# Patient Record
Sex: MALE | Race: WHITE
[De-identification: names, ages, dates, MRNs, and addresses within clinical notes are randomized per-mention and may not be internally consistent; named-entity substitution may affect disease eponyms.]

---

## 2019-07-12 ENCOUNTER — HOSPITAL ENCOUNTER (INPATIENT)
Dept: HOSPITAL 54 - ER | Age: 79
LOS: 4 days | Discharge: HOME HEALTH | DRG: 871 | End: 2019-07-16
Attending: INTERNAL MEDICINE | Admitting: NURSE PRACTITIONER
Payer: MEDICARE

## 2019-07-12 VITALS — SYSTOLIC BLOOD PRESSURE: 108 MMHG | DIASTOLIC BLOOD PRESSURE: 58 MMHG

## 2019-07-12 VITALS — DIASTOLIC BLOOD PRESSURE: 55 MMHG | SYSTOLIC BLOOD PRESSURE: 106 MMHG

## 2019-07-12 VITALS — HEIGHT: 70 IN | WEIGHT: 211 LBS | BODY MASS INDEX: 30.21 KG/M2

## 2019-07-12 VITALS — SYSTOLIC BLOOD PRESSURE: 103 MMHG | DIASTOLIC BLOOD PRESSURE: 59 MMHG

## 2019-07-12 VITALS — DIASTOLIC BLOOD PRESSURE: 59 MMHG | SYSTOLIC BLOOD PRESSURE: 103 MMHG

## 2019-07-12 DIAGNOSIS — N39.0: ICD-10-CM

## 2019-07-12 DIAGNOSIS — Z85.46: ICD-10-CM

## 2019-07-12 DIAGNOSIS — I48.0: ICD-10-CM

## 2019-07-12 DIAGNOSIS — E78.5: ICD-10-CM

## 2019-07-12 DIAGNOSIS — E03.9: ICD-10-CM

## 2019-07-12 DIAGNOSIS — J98.11: ICD-10-CM

## 2019-07-12 DIAGNOSIS — G47.33: ICD-10-CM

## 2019-07-12 DIAGNOSIS — R32: ICD-10-CM

## 2019-07-12 DIAGNOSIS — N50.1: ICD-10-CM

## 2019-07-12 DIAGNOSIS — Z87.442: ICD-10-CM

## 2019-07-12 DIAGNOSIS — J15.9: ICD-10-CM

## 2019-07-12 DIAGNOSIS — Z90.79: ICD-10-CM

## 2019-07-12 DIAGNOSIS — A41.51: Primary | ICD-10-CM

## 2019-07-12 DIAGNOSIS — F41.9: ICD-10-CM

## 2019-07-12 DIAGNOSIS — R93.5: ICD-10-CM

## 2019-07-12 DIAGNOSIS — Z16.12: ICD-10-CM

## 2019-07-12 DIAGNOSIS — K80.20: ICD-10-CM

## 2019-07-12 DIAGNOSIS — E87.2: ICD-10-CM

## 2019-07-12 DIAGNOSIS — N17.0: ICD-10-CM

## 2019-07-12 DIAGNOSIS — Z79.01: ICD-10-CM

## 2019-07-12 DIAGNOSIS — N18.9: ICD-10-CM

## 2019-07-12 DIAGNOSIS — J06.9: ICD-10-CM

## 2019-07-12 DIAGNOSIS — I21.A1: ICD-10-CM

## 2019-07-12 DIAGNOSIS — D69.6: ICD-10-CM

## 2019-07-12 DIAGNOSIS — I13.0: ICD-10-CM

## 2019-07-12 DIAGNOSIS — K86.1: ICD-10-CM

## 2019-07-12 DIAGNOSIS — F32.9: ICD-10-CM

## 2019-07-12 DIAGNOSIS — K57.30: ICD-10-CM

## 2019-07-12 DIAGNOSIS — I50.30: ICD-10-CM

## 2019-07-12 DIAGNOSIS — B96.89: ICD-10-CM

## 2019-07-12 LAB
ALBUMIN SERPL BCP-MCNC: 2.9 G/DL (ref 3.4–5)
ALBUMIN SERPL BCP-MCNC: 3.6 G/DL (ref 3.4–5)
ALP SERPL-CCNC: 108 U/L (ref 46–116)
ALP SERPL-CCNC: 80 U/L (ref 46–116)
ALT SERPL W P-5'-P-CCNC: 34 U/L (ref 12–78)
ALT SERPL W P-5'-P-CCNC: 42 U/L (ref 12–78)
AST SERPL W P-5'-P-CCNC: 33 U/L (ref 15–37)
AST SERPL W P-5'-P-CCNC: 35 U/L (ref 15–37)
BASOPHILS # BLD AUTO: 0 /CMM (ref 0–0.2)
BASOPHILS # BLD AUTO: 0 /CMM (ref 0–0.2)
BASOPHILS NFR BLD AUTO: 0.1 % (ref 0–2)
BASOPHILS NFR BLD AUTO: 0.2 % (ref 0–2)
BILIRUB DIRECT SERPL-MCNC: 0.2 MG/DL (ref 0–0.2)
BILIRUB SERPL-MCNC: 0.5 MG/DL (ref 0.2–1)
BILIRUB SERPL-MCNC: 0.8 MG/DL (ref 0.2–1)
BUN SERPL-MCNC: 27 MG/DL (ref 7–18)
BUN SERPL-MCNC: 29 MG/DL (ref 7–18)
CALCIUM SERPL-MCNC: 8.7 MG/DL (ref 8.5–10.1)
CALCIUM SERPL-MCNC: 9.3 MG/DL (ref 8.5–10.1)
CHLORIDE SERPL-SCNC: 104 MMOL/L (ref 98–107)
CHLORIDE SERPL-SCNC: 105 MMOL/L (ref 98–107)
CHOLEST SERPL-MCNC: 96 MG/DL (ref ?–200)
CO2 SERPL-SCNC: 23 MMOL/L (ref 21–32)
CO2 SERPL-SCNC: 24 MMOL/L (ref 21–32)
CREAT SERPL-MCNC: 2 MG/DL (ref 0.6–1.3)
CREAT SERPL-MCNC: 2 MG/DL (ref 0.6–1.3)
EOSINOPHIL NFR BLD AUTO: 0 % (ref 0–6)
EOSINOPHIL NFR BLD AUTO: 0.2 % (ref 0–6)
GLUCOSE SERPL-MCNC: 142 MG/DL (ref 74–106)
GLUCOSE SERPL-MCNC: 194 MG/DL (ref 74–106)
HCT VFR BLD AUTO: 49 % (ref 39–51)
HCT VFR BLD AUTO: 55 % (ref 39–51)
HDLC SERPL-MCNC: 42 MG/DL (ref 40–60)
HGB BLD-MCNC: 16.4 G/DL (ref 13.5–17.5)
HGB BLD-MCNC: 18.8 G/DL (ref 13.5–17.5)
LDLC SERPL DIRECT ASSAY-MCNC: 48 MG/DL (ref 0–99)
LYMPHOCYTES NFR BLD AUTO: 0.5 /CMM (ref 0.8–4.8)
LYMPHOCYTES NFR BLD AUTO: 0.5 /CMM (ref 0.8–4.8)
LYMPHOCYTES NFR BLD AUTO: 3.7 % (ref 20–44)
LYMPHOCYTES NFR BLD AUTO: 6.8 % (ref 20–44)
LYMPHOCYTES NFR BLD MANUAL: 2 % (ref 16–48)
MAGNESIUM SERPL-MCNC: 1.7 MG/DL (ref 1.8–2.4)
MCHC RBC AUTO-ENTMCNC: 33 G/DL (ref 31–36)
MCHC RBC AUTO-ENTMCNC: 34 G/DL (ref 31–36)
MCV RBC AUTO: 100 FL (ref 80–96)
MCV RBC AUTO: 102 FL (ref 80–96)
MONOCYTES NFR BLD AUTO: 0.4 /CMM (ref 0.1–1.3)
MONOCYTES NFR BLD AUTO: 1.8 /CMM (ref 0.1–1.3)
MONOCYTES NFR BLD AUTO: 14.4 % (ref 2–12)
MONOCYTES NFR BLD AUTO: 4.8 % (ref 2–12)
MONOCYTES NFR BLD MANUAL: 12 % (ref 0–11)
NEUTROPHILS # BLD AUTO: 10.4 /CMM (ref 1.8–8.9)
NEUTROPHILS # BLD AUTO: 6.7 /CMM (ref 1.8–8.9)
NEUTROPHILS NFR BLD AUTO: 81.8 % (ref 43–81)
NEUTROPHILS NFR BLD AUTO: 88 % (ref 43–81)
NEUTS BAND NFR BLD MANUAL: 3 % (ref 0–5)
NEUTS SEG NFR BLD MANUAL: 83 % (ref 42–76)
NT-PROBNP SERPL-MCNC: 823 PG/ML (ref 0–125)
PH UR STRIP: 5 [PH] (ref 5–8)
PHOSPHATE SERPL-MCNC: 2.9 MG/DL (ref 2.5–4.9)
PLATELET # BLD AUTO: 105 /CMM (ref 150–450)
PLATELET # BLD AUTO: 98 /CMM (ref 150–450)
POTASSIUM SERPL-SCNC: 4.1 MMOL/L (ref 3.5–5.1)
POTASSIUM SERPL-SCNC: 4.2 MMOL/L (ref 3.5–5.1)
PROT SERPL-MCNC: 5.5 G/DL (ref 6.4–8.2)
PROT SERPL-MCNC: 6.9 G/DL (ref 6.4–8.2)
PROT UR QL STRIP: 100 MG/DL
RBC # BLD AUTO: 4.84 MIL/UL (ref 4.5–6)
RBC # BLD AUTO: 5.49 MIL/UL (ref 4.5–6)
RBC #/AREA URNS HPF: (no result) /HPF (ref 0–2)
SODIUM SERPL-SCNC: 138 MMOL/L (ref 136–145)
SODIUM SERPL-SCNC: 139 MMOL/L (ref 136–145)
TRIGL SERPL-MCNC: 61 MG/DL (ref 30–150)
TSH SERPL DL<=0.005 MIU/L-ACNC: 0.45 UIU/ML (ref 0.36–3.74)
UROBILINOGEN UR STRIP-MCNC: 0.2 EU/DL
WBC #/AREA URNS HPF: (no result) /HPF (ref 0–3)
WBC NRBC COR # BLD AUTO: 12.7 K/UL (ref 4.3–11)
WBC NRBC COR # BLD AUTO: 7.6 K/UL (ref 4.3–11)

## 2019-07-12 PROCEDURE — A4216 STERILE WATER/SALINE, 10 ML: HCPCS

## 2019-07-12 PROCEDURE — G0378 HOSPITAL OBSERVATION PER HR: HCPCS

## 2019-07-12 RX ADMIN — MODAFINIL SCH MG: 100 TABLET ORAL at 18:21

## 2019-07-12 RX ADMIN — FUROSEMIDE SCH MG: 10 INJECTION, SOLUTION INTRAMUSCULAR; INTRAVENOUS at 09:49

## 2019-07-12 RX ADMIN — MODAFINIL SCH MG: 100 TABLET ORAL at 11:17

## 2019-07-12 RX ADMIN — LEVOTHYROXINE SODIUM SCH MCG: 50 TABLET ORAL at 11:17

## 2019-07-12 RX ADMIN — FAMOTIDINE SCH MG: 10 INJECTION INTRAVENOUS at 21:25

## 2019-07-12 RX ADMIN — MEMANTINE HYDROCHLORIDE SCH MG: 5 TABLET ORAL at 11:15

## 2019-07-12 RX ADMIN — ATORVASTATIN CALCIUM SCH MG: 10 TABLET, FILM COATED ORAL at 11:15

## 2019-07-12 RX ADMIN — BUPROPION HYDROCHLORIDE SCH MG: 150 TABLET, EXTENDED RELEASE ORAL at 11:15

## 2019-07-12 RX ADMIN — FAMOTIDINE SCH MG: 10 INJECTION INTRAVENOUS at 08:37

## 2019-07-12 RX ADMIN — ZOLPIDEM TARTRATE PRN MG: 5 TABLET, FILM COATED ORAL at 23:25

## 2019-07-12 RX ADMIN — METOPROLOL SUCCINATE SCH MG: 25 TABLET, EXTENDED RELEASE ORAL at 10:00

## 2019-07-12 RX ADMIN — MEMANTINE HYDROCHLORIDE SCH MG: 5 TABLET ORAL at 17:32

## 2019-07-12 RX ADMIN — APIXABAN SCH MG: 5 TABLET, FILM COATED ORAL at 17:32

## 2019-07-12 RX ADMIN — FUROSEMIDE SCH MG: 10 INJECTION, SOLUTION INTRAMUSCULAR; INTRAVENOUS at 13:58

## 2019-07-12 RX ADMIN — LISINOPRIL SCH MG: 10 TABLET ORAL at 10:00

## 2019-07-12 RX ADMIN — Medication SCH TAB: at 11:15

## 2019-07-12 RX ADMIN — AMLODIPINE BESYLATE SCH MG: 5 TABLET ORAL at 10:00

## 2019-07-12 NOTE — NUR
TO BED 2 BIB PARAMEDICS C/O FEVER SINCE EARLIER TONIGHT. PT AAOX1 NO ACUTE 
DISTRESS NOTED, RESP EVEN AND UNLABORED. PT HOT TO TOUCH, DIAPHORETIC. PLACE PT 
ON CARDIAC MONITORING, CONTINUOUS POX, O2@2L/NC. ER MD AT BEDSIDE TO EVAL PT 
WITH ORDERS RECEIVED. PT WIFE AT BEDSIDE. STARTED SL 18G TO L WRIST, BLOOD 
DRAWN AND SENT TO LAB.

## 2019-07-12 NOTE — NUR
m/s lvn: notes



pt refused breakfast and lunch when offered, stated, "i will eat in the middle of 
afternoon." instructed to call for assistance. will continue to monitor.

## 2019-07-12 NOTE — NUR
RECEIVE PT VIA [facility] FROM E.R AT 0648 PATIENT STABLE A/O X 4, ADMIT TO TELE VS STABLE. KEPT 
CLEAN AND DRY AND COMFORTABLE. SAFETY MEASURES IN PLACE. ENDORSE NEXT ADMITTING RN

## 2019-07-12 NOTE — NUR
m/s lvn: notes



pt resting comfortable in bed. lunch served, but pt doesn't want to eat at this time. 
instructed to call for assistance. will continue to monitor.

## 2019-07-12 NOTE — NUR
Patient resides locally with spouse in a single level dwelling. He is ambulatory and 
independent with adl's. Denies having DME or homehealth. Pcp is Dr. Dilan Evans. Current dc 
plan is to return home. 


-------------------------------------------------------------------------------

Addendum: 07/12/19 at 1631 by JAMES LUDWIG RN

-------------------------------------------------------------------------------

Amended: Links added.

## 2019-07-12 NOTE — NUR
KELI

RECEIVED CALL FROM [employer] PATIENT BLOOD CULTURE HAS GRAM NEGATIVE RODS. PER [employer] 
WILL FAXED FINAL REPORT OF BLOOD CULTURE IN AM.

## 2019-07-12 NOTE — NUR
tele lvn: md visit



seen and examined by rianna nunez (acnp) at this time. informed md re: advance directive (no 
heroic measures per wife); wife to bring copy today. given phone number to np to call pt's 
primary care at [facility]. pt appears to be forgetful. reality orientation provided prn. 
instructed to call for assistance. tele sr.=[id number]'s. call light within reach. will monitor.

## 2019-07-12 NOTE — NUR
m/s lvn: notes



wife left. pt resting comfortable in bed. remains afebrile. needs attended. instructed to 
call for assistance. will continue to monitor.

## 2019-07-12 NOTE — NUR
tele lvn: admission



admitted this 79 years old male pt from e.. with dx: pneumonia and sepsis. wife and friend 
at bedside. wife answering all questions due to pt has hx: dementia. pt is awake, a/ox3-4. 
oriented to room and surroundings. noted with 2 h/l (right and left wrist). tele sr=[id number]. no 
c/o pain or any discomfort. afebrile. in  no apparent distress noted. will continue to 
monitor.

## 2019-07-12 NOTE — NUR
m/s lvn: notes



wife here and brought some clothes and a couple of books. updated belongings lists. pt 
feeling much better. no distress noted. will continue to monitor.

## 2019-07-12 NOTE — NUR
MSRN

FULLY AWAKE, SITTING ON THE SIDE OF HIS BED.  NO DISCOMFORTS MADE.  SAFETY PRECAUTIONS 
EMPHASIZED, WELL UNDERSTOOD.  REMINDED TO CALL STAFF FOR ANY ASSISTANCE OR DISCOMFORTS, CALL 
LIGHT WITHIN REACH.  NO FURTHER NEEDS MADE.  CLOSELY WATCHED.

## 2019-07-13 VITALS — SYSTOLIC BLOOD PRESSURE: 106 MMHG | DIASTOLIC BLOOD PRESSURE: 48 MMHG

## 2019-07-13 VITALS — SYSTOLIC BLOOD PRESSURE: 115 MMHG | DIASTOLIC BLOOD PRESSURE: 61 MMHG

## 2019-07-13 VITALS — SYSTOLIC BLOOD PRESSURE: 121 MMHG | DIASTOLIC BLOOD PRESSURE: 74 MMHG

## 2019-07-13 VITALS — SYSTOLIC BLOOD PRESSURE: 124 MMHG | DIASTOLIC BLOOD PRESSURE: 64 MMHG

## 2019-07-13 LAB
ALBUMIN SERPL BCP-MCNC: 3 G/DL (ref 3.4–5)
ALP SERPL-CCNC: 85 U/L (ref 46–116)
ALT SERPL W P-5'-P-CCNC: 42 U/L (ref 12–78)
AST SERPL W P-5'-P-CCNC: 31 U/L (ref 15–37)
BASOPHILS # BLD AUTO: 0 /CMM (ref 0–0.2)
BASOPHILS NFR BLD AUTO: 0.4 % (ref 0–2)
BILIRUB SERPL-MCNC: 0.5 MG/DL (ref 0.2–1)
BUN SERPL-MCNC: 33 MG/DL (ref 7–18)
CALCIUM SERPL-MCNC: 9.1 MG/DL (ref 8.5–10.1)
CHLORIDE SERPL-SCNC: 105 MMOL/L (ref 98–107)
CO2 SERPL-SCNC: 26 MMOL/L (ref 21–32)
CREAT SERPL-MCNC: 2.2 MG/DL (ref 0.6–1.3)
EOSINOPHIL NFR BLD AUTO: 0.9 % (ref 0–6)
GLUCOSE SERPL-MCNC: 115 MG/DL (ref 74–106)
HCT VFR BLD AUTO: 50 % (ref 39–51)
HGB BLD-MCNC: 17.2 G/DL (ref 13.5–17.5)
LYMPHOCYTES NFR BLD AUTO: 0.5 /CMM (ref 0.8–4.8)
LYMPHOCYTES NFR BLD AUTO: 6.2 % (ref 20–44)
LYMPHOCYTES NFR BLD MANUAL: 4 % (ref 16–48)
MAGNESIUM SERPL-MCNC: 2 MG/DL (ref 1.8–2.4)
MCHC RBC AUTO-ENTMCNC: 34 G/DL (ref 31–36)
MCV RBC AUTO: 101 FL (ref 80–96)
MONOCYTES NFR BLD AUTO: 1.7 /CMM (ref 0.1–1.3)
MONOCYTES NFR BLD AUTO: 19.7 % (ref 2–12)
MONOCYTES NFR BLD MANUAL: 17 % (ref 0–11)
NEUTROPHILS # BLD AUTO: 6.2 /CMM (ref 1.8–8.9)
NEUTROPHILS NFR BLD AUTO: 72.8 % (ref 43–81)
NEUTS SEG NFR BLD MANUAL: 79 % (ref 42–76)
PHOSPHATE SERPL-MCNC: 2.9 MG/DL (ref 2.5–4.9)
PLATELET # BLD AUTO: 100 /CMM (ref 150–450)
POTASSIUM SERPL-SCNC: 4.1 MMOL/L (ref 3.5–5.1)
PROT SERPL-MCNC: 6.1 G/DL (ref 6.4–8.2)
RBC # BLD AUTO: 4.99 MIL/UL (ref 4.5–6)
SODIUM SERPL-SCNC: 140 MMOL/L (ref 136–145)
WBC NRBC COR # BLD AUTO: 8.5 K/UL (ref 4.3–11)

## 2019-07-13 RX ADMIN — DEXTROSE MONOHYDRATE SCH MLS/HR: 50 INJECTION, SOLUTION INTRAVENOUS at 06:38

## 2019-07-13 RX ADMIN — IMIPRAMINE HYDROCHLORIDE SCH MG: 25 TABLET, FILM COATED ORAL at 19:27

## 2019-07-13 RX ADMIN — DEXTROSE MONOHYDRATE SCH MLS/HR: 50 INJECTION, SOLUTION INTRAVENOUS at 06:04

## 2019-07-13 RX ADMIN — APIXABAN SCH MG: 5 TABLET, FILM COATED ORAL at 09:47

## 2019-07-13 RX ADMIN — MODAFINIL SCH MG: 100 TABLET ORAL at 10:04

## 2019-07-13 RX ADMIN — ZOLPIDEM TARTRATE PRN MG: 5 TABLET, FILM COATED ORAL at 21:50

## 2019-07-13 RX ADMIN — IMIPRAMINE HYDROCHLORIDE SCH MG: 25 TABLET, FILM COATED ORAL at 09:38

## 2019-07-13 RX ADMIN — BUPROPION HYDROCHLORIDE SCH MG: 150 TABLET, EXTENDED RELEASE ORAL at 09:36

## 2019-07-13 RX ADMIN — MODAFINIL SCH MG: 100 TABLET ORAL at 19:28

## 2019-07-13 RX ADMIN — ATORVASTATIN CALCIUM SCH MG: 10 TABLET, FILM COATED ORAL at 09:36

## 2019-07-13 RX ADMIN — METOPROLOL SUCCINATE SCH MG: 25 TABLET, EXTENDED RELEASE ORAL at 09:00

## 2019-07-13 RX ADMIN — MEMANTINE HYDROCHLORIDE SCH MG: 5 TABLET ORAL at 19:28

## 2019-07-13 RX ADMIN — LEVOTHYROXINE SODIUM SCH MCG: 50 TABLET ORAL at 09:36

## 2019-07-13 RX ADMIN — Medication SCH TAB: at 09:39

## 2019-07-13 RX ADMIN — APIXABAN SCH MG: 5 TABLET, FILM COATED ORAL at 19:29

## 2019-07-13 RX ADMIN — LISINOPRIL SCH MG: 10 TABLET ORAL at 09:00

## 2019-07-13 RX ADMIN — AMLODIPINE BESYLATE SCH MG: 5 TABLET ORAL at 09:00

## 2019-07-13 RX ADMIN — PANTOPRAZOLE SODIUM SCH MG: 40 TABLET, DELAYED RELEASE ORAL at 09:56

## 2019-07-13 RX ADMIN — MEMANTINE HYDROCHLORIDE SCH MG: 5 TABLET ORAL at 09:37

## 2019-07-13 NOTE — NUR
MS RN NOTES



RECEIVED PT IN BED AWAKE AND ABLE TO MAKE NEEDS KNOWN WITH FAMILY AT BEDSIDE.  PT A/O X2-3 
WITH PERIODS OF CONFUSION.  RESPIRATIONS EVEN AND UNLABORED WITH NO S/S OF ACUTE DISTRESS OR 
SOB NOTED.  NO COMPLAINTS OF PAIN AT THIS TIME.  SAFETY MEASURES IN PLACE WITH BED IN LOWEST 
LOCKED POSITION WITH SIDE RAILS UP X2.  CALL LIGHT WITHIN REACH.  WILL CONTINUE TO MONITOR.

## 2019-07-13 NOTE — NUR
MSRN

AMBULATING AROUND ROOM, MAKING SELF BUSY FIXING HIS PERSONAL BELONGINGS, READING BOOKS.  
REQUESTED AMBIEN EARLIER STILL AWAKE AS OF THIS TIME.  SAFETY PRECAUTIONS EMPHASIZED, WELL 
UNDERSTOOD.

## 2019-07-13 NOTE — NUR
MSRN

 REMAINS UNCHANGE, REMAINS AFEBRILE.  NO MICRO REPORTS RECEIVE FROM [employer].  WILL 
ENDORSE TO INCOMING RN

## 2019-07-14 VITALS — SYSTOLIC BLOOD PRESSURE: 126 MMHG | DIASTOLIC BLOOD PRESSURE: 70 MMHG

## 2019-07-14 VITALS — DIASTOLIC BLOOD PRESSURE: 53 MMHG | SYSTOLIC BLOOD PRESSURE: 101 MMHG

## 2019-07-14 VITALS — DIASTOLIC BLOOD PRESSURE: 71 MMHG | SYSTOLIC BLOOD PRESSURE: 122 MMHG

## 2019-07-14 LAB
BASOPHILS # BLD AUTO: 0 /CMM (ref 0–0.2)
BASOPHILS NFR BLD AUTO: 0.2 % (ref 0–2)
BUN SERPL-MCNC: 26 MG/DL (ref 7–18)
CALCIUM SERPL-MCNC: 9 MG/DL (ref 8.5–10.1)
CHLORIDE SERPL-SCNC: 105 MMOL/L (ref 98–107)
CO2 SERPL-SCNC: 26 MMOL/L (ref 21–32)
CREAT SERPL-MCNC: 2 MG/DL (ref 0.6–1.3)
EOSINOPHIL NFR BLD AUTO: 0.2 % (ref 0–6)
GLUCOSE SERPL-MCNC: 157 MG/DL (ref 74–106)
HCT VFR BLD AUTO: 48 % (ref 39–51)
HGB BLD-MCNC: 16.4 G/DL (ref 13.5–17.5)
LYMPHOCYTES NFR BLD AUTO: 0.7 /CMM (ref 0.8–4.8)
LYMPHOCYTES NFR BLD AUTO: 9.4 % (ref 20–44)
LYMPHOCYTES NFR BLD MANUAL: 11 % (ref 16–48)
MAGNESIUM SERPL-MCNC: 1.8 MG/DL (ref 1.8–2.4)
MCHC RBC AUTO-ENTMCNC: 34 G/DL (ref 31–36)
MCV RBC AUTO: 101 FL (ref 80–96)
MONOCYTES NFR BLD AUTO: 1.6 /CMM (ref 0.1–1.3)
MONOCYTES NFR BLD AUTO: 21.7 % (ref 2–12)
MONOCYTES NFR BLD MANUAL: 19 % (ref 0–11)
NEUTROPHILS # BLD AUTO: 4.9 /CMM (ref 1.8–8.9)
NEUTROPHILS NFR BLD AUTO: 68.5 % (ref 43–81)
NEUTS BAND NFR BLD MANUAL: 23 % (ref 0–5)
NEUTS SEG NFR BLD MANUAL: 47 % (ref 42–76)
PHOSPHATE SERPL-MCNC: 2.9 MG/DL (ref 2.5–4.9)
PLATELET # BLD AUTO: 106 /CMM (ref 150–450)
POTASSIUM SERPL-SCNC: 4.7 MMOL/L (ref 3.5–5.1)
RBC # BLD AUTO: 4.77 MIL/UL (ref 4.5–6)
SODIUM SERPL-SCNC: 139 MMOL/L (ref 136–145)
WBC NRBC COR # BLD AUTO: 7.2 K/UL (ref 4.3–11)

## 2019-07-14 RX ADMIN — APIXABAN SCH MG: 5 TABLET, FILM COATED ORAL at 17:24

## 2019-07-14 RX ADMIN — DEXTROSE MONOHYDRATE SCH MLS/HR: 50 INJECTION, SOLUTION INTRAVENOUS at 04:56

## 2019-07-14 RX ADMIN — BUPROPION HYDROCHLORIDE SCH MG: 150 TABLET, EXTENDED RELEASE ORAL at 08:17

## 2019-07-14 RX ADMIN — MEMANTINE HYDROCHLORIDE SCH MG: 5 TABLET ORAL at 17:24

## 2019-07-14 RX ADMIN — METOPROLOL SUCCINATE SCH MG: 25 TABLET, EXTENDED RELEASE ORAL at 08:17

## 2019-07-14 RX ADMIN — IMIPRAMINE HYDROCHLORIDE SCH MG: 25 TABLET, FILM COATED ORAL at 17:24

## 2019-07-14 RX ADMIN — MODAFINIL SCH MG: 100 TABLET ORAL at 17:24

## 2019-07-14 RX ADMIN — ZOLPIDEM TARTRATE PRN MG: 5 TABLET, FILM COATED ORAL at 22:10

## 2019-07-14 RX ADMIN — APIXABAN SCH MG: 5 TABLET, FILM COATED ORAL at 08:19

## 2019-07-14 RX ADMIN — PANTOPRAZOLE SODIUM SCH MG: 40 TABLET, DELAYED RELEASE ORAL at 08:17

## 2019-07-14 RX ADMIN — LEVOTHYROXINE SODIUM SCH MCG: 50 TABLET ORAL at 08:17

## 2019-07-14 RX ADMIN — MEROPENEM SCH MLS/HR: 1 INJECTION INTRAVENOUS at 19:17

## 2019-07-14 RX ADMIN — AMLODIPINE BESYLATE SCH MG: 5 TABLET ORAL at 08:17

## 2019-07-14 RX ADMIN — MODAFINIL SCH MG: 100 TABLET ORAL at 08:17

## 2019-07-14 RX ADMIN — MEMANTINE HYDROCHLORIDE SCH MG: 5 TABLET ORAL at 08:17

## 2019-07-14 RX ADMIN — ATORVASTATIN CALCIUM SCH MG: 10 TABLET, FILM COATED ORAL at 08:17

## 2019-07-14 RX ADMIN — IMIPRAMINE HYDROCHLORIDE SCH MG: 25 TABLET, FILM COATED ORAL at 08:20

## 2019-07-14 RX ADMIN — Medication SCH TAB: at 08:17

## 2019-07-14 RX ADMIN — DEXTROSE MONOHYDRATE SCH MLS/HR: 50 INJECTION, SOLUTION INTRAVENOUS at 05:57

## 2019-07-14 NOTE — NUR
MS RN CLOSING NOTES



ALL DUE MEDS GIVEN, NEEDS MET AND RENDERED. PT REMAINS A/O 3-4, HOWEVER W/ PERIODS OF 
FORGETFULNESS. RESPIRATIONS ARE EVEN AND UNLABORED, NOT IN ANY ACUTE DISTRESS NOTED. PT  
DENIES ANY PAIN AT THIS TIME, NO C/O SOB, N/V. IV SITE TO  LEFT WRIST INTACT, NO 
INFILTRATION NOTED. DRESSING KEPT CLEAN AND DRY. REMINDED PT TO USE CALL LIGHT WHEN 
ASSISTANCE IS NEEDED, CALL LIGHT IS LEFT WITHIN REACH. WILL ENDORSE TO NEXT SHIFT FOR 
CONTINUITY OF CARE.

## 2019-07-14 NOTE — NUR
MS RN NOTES-- CALLED DR. ARREGUIN TO NOTIFY RE: URINE CX RESULTS, RESISTIVE TO ROCEPHINE. 
WAITING A CALL BACK.

## 2019-07-14 NOTE — NUR
MS/RN OPENING NOTES



PT RECEIVED AWAKE, SITTING AT THE EDGE OF THE BED WITH WIFE AT BEDSIDE. A/OX3. ON ROOM AIR, 
BREATHING EVEN AND UNLABORED. DENIES SOB AND PAIN AT THIS TIME. IN NO ACUTE DISTRESS. IV TO 
LEFT WRIST CURRENTLY INFUSING ABX. NO NEEDS EXPRESSED AT THIS TIME. BED IN LOW/LOCKED 
POSITION WITH CALL LIGHT IN REACH. BILAT. UPPER SIDE RAILS IN PLACE. ONLY REQUEST FOR AMBIEN 
AT 2200. WILL CONTINUE TO MONITOR

## 2019-07-14 NOTE — NUR
MS RN NOTES



PT IN BED SLEEPING BUT EASILY AWOKEN VERBALLY OR BY TOUCH.  PT A/O X2-3 WITH PERIODS OF 
CONFUSION AND ABLE TO MAKE NEEDS KNOWN.  RESPIRATIONS EVEN AND UNLABORED WITH NO S/S OF 
ACUTE DISTRESS OR SOB NOTED THROUGHOUT SHIFT.  NO COMPLAINTS OF PAIN AT THIS TIME.  SAFETY 
MEASURES IN PLACE WITH BED IN LOWEST LOCKED POSITION WITH SIDE RAILS UP X2.  CALL LIGHT 
WITHIN REACH.  WILL ENDORSE TO ONCOMING NURSE FOR ROLF.

## 2019-07-14 NOTE — NUR
MS RN NOTES-- PT ABLE TO MAKE NEEDS KNOWN. NEEDS MET AND RENDERED. PT DOES NOT APPEAR TO BE 
IN ANY ACUTE DISTRESS. WILL CONTINUE TO MONITOR.

## 2019-07-14 NOTE — NUR
MS RN OPENING NOTES



RECEIVED PT LAYING IN BED WITH HOB SLIGHTLY ELEVATED, RESTING COMFORTABLY. PT IS EASILY 
AROUSABLE, A/O X2-3. RESPIRATIONS ARE EVEN AND UNLABORED, NOT IN ANY ACUTE DISTRESS NOTED. 
PT DENIES ANY PAIN AT THIS TIME, NO C/O SOB, N/V. BILATERAL WRISTS NOTED WITH IV, INTACT, NO 
INFILTRATION NOTED. DRESSING KEPT CLEAN AND DRY. SAFETY MEASURES ARE IN PLACE. INSTRUCTED PT 
TO USE CALL LIGHT WHEN ASSISTANCE IS NEEDED, CALL LIGHT IS LEFT WITHIN REACH. WILL MONITOR 
THROUGHOUT SHIFT FOR CONTINUITY OF CARE.

## 2019-07-15 VITALS — SYSTOLIC BLOOD PRESSURE: 112 MMHG | DIASTOLIC BLOOD PRESSURE: 43 MMHG

## 2019-07-15 VITALS — SYSTOLIC BLOOD PRESSURE: 141 MMHG | DIASTOLIC BLOOD PRESSURE: 88 MMHG

## 2019-07-15 VITALS — DIASTOLIC BLOOD PRESSURE: 69 MMHG | SYSTOLIC BLOOD PRESSURE: 133 MMHG

## 2019-07-15 PROCEDURE — 05H633Z INSERTION OF INFUSION DEVICE INTO LEFT SUBCLAVIAN VEIN, PERCUTANEOUS APPROACH: ICD-10-PCS | Performed by: NURSE PRACTITIONER

## 2019-07-15 RX ADMIN — AMLODIPINE BESYLATE SCH MG: 5 TABLET ORAL at 08:18

## 2019-07-15 RX ADMIN — ATORVASTATIN CALCIUM SCH MG: 10 TABLET, FILM COATED ORAL at 08:17

## 2019-07-15 RX ADMIN — BUPROPION HYDROCHLORIDE SCH MG: 150 TABLET, EXTENDED RELEASE ORAL at 08:21

## 2019-07-15 RX ADMIN — MEMANTINE HYDROCHLORIDE SCH MG: 5 TABLET ORAL at 17:03

## 2019-07-15 RX ADMIN — APIXABAN SCH MG: 5 TABLET, FILM COATED ORAL at 17:01

## 2019-07-15 RX ADMIN — PANTOPRAZOLE SODIUM SCH MG: 40 TABLET, DELAYED RELEASE ORAL at 08:21

## 2019-07-15 RX ADMIN — APIXABAN SCH MG: 5 TABLET, FILM COATED ORAL at 08:26

## 2019-07-15 RX ADMIN — MEMANTINE HYDROCHLORIDE SCH MG: 5 TABLET ORAL at 08:15

## 2019-07-15 RX ADMIN — MODAFINIL SCH MG: 100 TABLET ORAL at 17:03

## 2019-07-15 RX ADMIN — MEROPENEM SCH MLS/HR: 1 INJECTION INTRAVENOUS at 06:18

## 2019-07-15 RX ADMIN — IMIPRAMINE HYDROCHLORIDE SCH MG: 25 TABLET, FILM COATED ORAL at 17:02

## 2019-07-15 RX ADMIN — Medication SCH TAB: at 08:14

## 2019-07-15 RX ADMIN — MODAFINIL SCH MG: 100 TABLET ORAL at 08:21

## 2019-07-15 RX ADMIN — IMIPRAMINE HYDROCHLORIDE SCH MG: 25 TABLET, FILM COATED ORAL at 08:26

## 2019-07-15 RX ADMIN — LEVOTHYROXINE SODIUM SCH MCG: 50 TABLET ORAL at 08:17

## 2019-07-15 RX ADMIN — METOPROLOL SUCCINATE SCH MG: 25 TABLET, EXTENDED RELEASE ORAL at 08:21

## 2019-07-15 RX ADMIN — MEROPENEM SCH MLS/HR: 1 INJECTION INTRAVENOUS at 18:42

## 2019-07-15 NOTE — NUR
MS/RN OPENING NOTES



PT RECEIVED AWAKE, HIGH FOWLERS IN BED WITH WIFE AT BEDSIDE. A/OX3. ON ROOM AIR, BREATHING 
EVEN AND UNLABORED. NO C/O SOB OR PAIN AT THIS TIME. IN NO ACUTE DISTRESS. ALYCIA MIDLINE 
PATENT AND INTACT CURRENTLY INFUSING IV MERREM AT THIS TIME. NO NEEDS EXPRESSED AT THIS 
TIME. ISOLATION PRECAUTIONS IMPLEMENTED. BED IN LOW/LOCKED POSITION WITH CALL LIGHT IN 
REACH. BILAT. UPPER SIDE RAILS IN PLACE. WILL CONTINUE TO MONITOR

## 2019-07-15 NOTE — NUR
RN OPENING NOTES



RECEIVED PATIENT SITTING ON A CHAIR. A/OX4, ABLE TO MAKE NEEDS KNOWN. NOT IN ANY FORM OF 
DISTRESS, NO SOB, DENIED PAIN OR DISCOMFORT AT THIS TIME. IV ACCESS INTACT AND PATENT. KEPT 
PATIENT SAFE AND COMFORTABLE. BED IN LOW/LOCKED POSITION. BILATERAL SIDERAILS UP,CALL LIGHTS 
IN REACH. WILL CONTINUE TO MONITOR ACCORDINGLY

## 2019-07-15 NOTE — NUR
MS/RN CLOSING NOTES



PT AWAKE, SITTING IN THE CHAIR. A/O3. ON ROOM AIR, BREATHING EVEN AND UNLABORED. IN NO ACUTE 
DISTRESS. DENIES SOB AND PAIN AT THIS TIME. IV TO LEFT WRIST PATENT AND INTACT WITH IV ABX 
ONGOING. NO SIGNIFICANT CHANGES OVERNIGHT. ALL NEEDS MET AND ANTICIPATED. BED REMAINS IN 
LOW/LCOKED POSITION WITH CALL LIGHT IN REACH. SEMI FOWLERS. BILAT. UPPER SIDE RAILS IN 
PLACE. WILL ENDORSE TO DAY SHIFT RN ROLF.

## 2019-07-16 VITALS — DIASTOLIC BLOOD PRESSURE: 71 MMHG | SYSTOLIC BLOOD PRESSURE: 137 MMHG

## 2019-07-16 VITALS — SYSTOLIC BLOOD PRESSURE: 137 MMHG | DIASTOLIC BLOOD PRESSURE: 71 MMHG

## 2019-07-16 RX ADMIN — METOPROLOL SUCCINATE SCH MG: 25 TABLET, EXTENDED RELEASE ORAL at 08:35

## 2019-07-16 RX ADMIN — Medication SCH TAB: at 08:35

## 2019-07-16 RX ADMIN — APIXABAN SCH MG: 5 TABLET, FILM COATED ORAL at 08:36

## 2019-07-16 RX ADMIN — ATORVASTATIN CALCIUM SCH MG: 10 TABLET, FILM COATED ORAL at 08:37

## 2019-07-16 RX ADMIN — MEMANTINE HYDROCHLORIDE SCH MG: 5 TABLET ORAL at 08:37

## 2019-07-16 RX ADMIN — LEVOTHYROXINE SODIUM SCH MCG: 50 TABLET ORAL at 08:26

## 2019-07-16 RX ADMIN — MEROPENEM SCH MLS/HR: 1 INJECTION INTRAVENOUS at 05:38

## 2019-07-16 RX ADMIN — ZOLPIDEM TARTRATE PRN MG: 5 TABLET, FILM COATED ORAL at 00:11

## 2019-07-16 RX ADMIN — BUPROPION HYDROCHLORIDE SCH MG: 150 TABLET, EXTENDED RELEASE ORAL at 08:36

## 2019-07-16 RX ADMIN — PANTOPRAZOLE SODIUM SCH MG: 40 TABLET, DELAYED RELEASE ORAL at 08:26

## 2019-07-16 RX ADMIN — AMLODIPINE BESYLATE SCH MG: 5 TABLET ORAL at 08:36

## 2019-07-16 RX ADMIN — IMIPRAMINE HYDROCHLORIDE SCH MG: 25 TABLET, FILM COATED ORAL at 08:36

## 2019-07-16 RX ADMIN — MODAFINIL SCH MG: 100 TABLET ORAL at 08:35

## 2019-07-16 NOTE — NUR
MS/RN NOTES



PT REQUESTING SLEEPING PILL. ADMINISTERED AMBIEN 5MG PO AS ORDERED. NO OTHER NEEDS EXPRESSED 
AT THIS TIME

## 2019-07-16 NOTE — NUR
MS RN NOTES



PATIENT DISCHARGED TO HOME TODAY. LEFT WITH WIFE IN PRIVATE VEHICLE. DISCHARGE INSTRUCTIONS, 
PACKET AND TEACHINGS GIVEN TO PATIENT AND WIFE. PATIENT VERBALIZES UNDERSTANDING OF 
INSTRUCTIONS GIVEN.  ALYCIA MIDLINE INTACT AND PATENT WITHOUT S/S OF COMPLICATIONS. [facility] [employer] [facility] CONTACT NUMBER [phone] AND PATIENT'S WIFE KEELY SPOKE TO ALLA 
REGARDING SUPPLIES, MEDICATIONS AND HOME HEALTH. ALL BELONGINGS CHECKED WITH PATIENT AND ALL 
ACCOUTNED FOR. PATIENT LEFT IN STABLE CONDITION.

## 2019-07-16 NOTE — NUR
MS RN NOTES



RECEIVED PATIENT IN BED ASLEEP, AROUSABLE. ALERT AND ORIENTED X3. HOB ELEVATED. NO SOB 
NOTED. DENIES ANY 

C/O PAIN NOR DISCOMFORT AT THIS TIME. ALYCIA MIDLINE INTACT AND PATENT. BED IN LOWEST POSITION. 
CALL LIGHT WITHIN REACH.

## 2019-07-16 NOTE — NUR
MS/RN NOTES



PT ASLEEP, BREATHING EVEN AND UNLABORED. IN NO ACUTE DISTRESS. WILL CONTINUE TO MONITOR

## 2019-07-16 NOTE — NUR
MS/RN CLOSING NOTES



PT ASLEEP, RESPONSIVE TO NAME. A/OX3. REMAINS ON ROOM AIR, BREATHING EVEN AND UNLABORED. 
DENIES SOB AND PAIN AT THIS TIME. ALYCIA MIDLINE PATENT AND INTACT CURRENTLY INFUSING IV 
MERREM. NO SIGNIFICANT CHANGES OVERNIGHT. ALL NEEDS MET AND ANTICIPATED. ISOLATION 
PRECAUTIONS IMPLEMENTED THROUGHOUT SHIFT. BED IN LOW/LOCKED POSITION WITH CALL LIGHT IN 
REACH. BILAT. UPPER SIDE RAILS IN PLACE. WILL ENDORSE TO DAY SHIFT RN ROLF.